# Patient Record
Sex: FEMALE | Race: WHITE | NOT HISPANIC OR LATINO | Employment: FULL TIME | URBAN - METROPOLITAN AREA
[De-identification: names, ages, dates, MRNs, and addresses within clinical notes are randomized per-mention and may not be internally consistent; named-entity substitution may affect disease eponyms.]

---

## 2022-04-14 ENCOUNTER — OFFICE VISIT (OUTPATIENT)
Dept: BARIATRICS | Facility: CLINIC | Age: 37
End: 2022-04-14
Payer: COMMERCIAL

## 2022-04-14 VITALS
BODY MASS INDEX: 44.38 KG/M2 | HEART RATE: 79 BPM | HEIGHT: 63 IN | TEMPERATURE: 98.1 F | DIASTOLIC BLOOD PRESSURE: 79 MMHG | WEIGHT: 250.5 LBS | SYSTOLIC BLOOD PRESSURE: 128 MMHG

## 2022-04-14 DIAGNOSIS — R63.5 ABNORMAL WEIGHT GAIN: ICD-10-CM

## 2022-04-14 DIAGNOSIS — K91.2 POSTSURGICAL MALABSORPTION: ICD-10-CM

## 2022-04-14 DIAGNOSIS — Z98.84 BARIATRIC SURGERY STATUS: ICD-10-CM

## 2022-04-14 DIAGNOSIS — Z48.815 ENCOUNTER FOR SURGICAL AFTERCARE FOLLOWING SURGERY OF DIGESTIVE SYSTEM: Primary | ICD-10-CM

## 2022-04-14 DIAGNOSIS — E66.01 OBESITY, CLASS III, BMI 40-49.9 (MORBID OBESITY) (HCC): ICD-10-CM

## 2022-04-14 PROCEDURE — 99203 OFFICE O/P NEW LOW 30 MIN: CPT | Performed by: NURSE PRACTITIONER

## 2022-04-14 RX ORDER — MULTIVITAMIN
1 TABLET ORAL DAILY
COMMUNITY

## 2022-04-14 RX ORDER — FERROUS SULFATE 325(65) MG
325 TABLET ORAL
COMMUNITY

## 2022-04-14 NOTE — PROGRESS NOTES
Assessment/Plan:     Patient ID: Joseph Giles is a 43 y o  female  Bariatric Surgery Status/Weight Gain    -s/p Jeny-En-Y Gastric Bypass with Dr Adelaide Wallace in 10/2007 and RNYGB revision with Dr Siomara Quinn in 2017  Presents to the office today for OD annual with concern of weight gain  Feels in the past two years, she noticed a more noticeable weight gain  Overall feels stress  Tries to eat healthy and meal portion  Snacks often throughout the day  Works from home  Walks 2-3 miles approximately 2-3 times per week  Does not food log  Interested in back to basics with possible medication therapy to help with her lose weight  Initial - 270 lbs (before RNYGB in 2007)  Andrew - 140 lbs (after RNYGB)    Initial - 230 lbs (before her revision)  Andrew - 216 lbs (after revision)  Current weight of 250 5 lbs - gained 20 lbs over her initial weight from her revision  She tolerates a regular diet  Denies having any other issues, no N/V/D/C, regurgitation, reflux or dysphagia  PLAN:     - Order UGI to assess anatomy  If normal, will refer to dietitian and  for diet and emotional support  Back to basics first  Pardeep binder provided to her  - Discuss with patient to increase her activity level - at least 150 minutes/week  - Start to food log, calorie count provided approximately 1400 calories to lost 1-2 lbs per week  - make healthy choices for snacks  - Routine follow up in 1 year for annual visit  - Continue with healthy lifestyle, adequate protein intake of 60 gm, fluid intake of at least 64 oz  - Continue with MVI daily  Advised to switch to pardeep MVI and add calcium supplements  - Activity as tolerated  - Labs ordered and will adjust accordingly if any deficiency  - Follow up with RD and SW as needed  · Continued/Maintain healthy weight loss with good nutrition intakes  · Adequate hydration with at least 64oz  fluid intake  · Follow diet as discussed    · Follow vitamin and mineral recommendations as reviewed with you  · Exercise as tolerated  · Colonoscopy referral made: not of age range  · Mammogram - will see ob-gyn    · Follow-up in 1 year  We kindly ask that your arrive 15 minutes before your scheduled appointment time with your provider to allow our staff to room you, get your vital signs and update your chart  · Get lab work done  Please call the office if you need a script  It is recommended to check with your insurance BEFORE getting labs done to make sure they are covered by your policy  · Call our office if you have any problems with abdominal pain especially associated with fever, chills, nausea, vomiting or any other concerns  · All  Post-bariatric surgery patients should be aware that very small quantities of any alcohol can cause impairment and it is very possible not to feel the effect  The effect can be in the system for several hours  It is also a stomach irritant  · It is advised to AVOID alcohol, Nonsteroidal antiinflammatory drugs (NSAIDS) and nicotine of all forms   Any of these can cause stomach irritation/pain  · Discussed the effects of alcohol on a bariatric patient and the increased impairment risk  · Keep up the good work! Postsurgical Malabsorption   -At risk for malabsorption of vitamins/minerals secondary to malabsorption and restriction of intake from bariatric surgery  -NOT Currently taking adequate postop bariatric surgery vitamin supplementation  - No recent bariatric labs - per chart review, low level of ferritin, 2020 - 8; 2021 - 16  Taking one iron supplement 325 mg (65 Fe)     -Next set of bariatric labs ordered for approximately 2 weeks  -Patient received education about the importance of adhering to a lifelong supplementation regimen to avoid vitamin/mineral deficiencies      Diagnoses and all orders for this visit:    Encounter for surgical aftercare following surgery of digestive system  -     Vitamin D 25 hydroxy; Future  -     Vitamin B12; Future  -     Zinc; Future  -     PTH, intact; Future  -     Vitamin A; Future  -     Vitamin B1, whole blood; Future  -     Iron Saturation %; Future  -     Folate; Future  -     Ferritin; Future  -     Comprehensive metabolic panel; Future  -     CBC and Platelet; Future  -     FL UPPER GI UGI; Future    Bariatric surgery status  -     Vitamin D 25 hydroxy; Future  -     Vitamin B12; Future  -     Zinc; Future  -     PTH, intact; Future  -     Vitamin A; Future  -     Vitamin B1, whole blood; Future  -     Iron Saturation %; Future  -     Folate; Future  -     Ferritin; Future  -     Comprehensive metabolic panel; Future  -     CBC and Platelet; Future  -     FL UPPER GI UGI; Future    Postsurgical malabsorption  -     Vitamin D 25 hydroxy; Future  -     Vitamin B12; Future  -     Zinc; Future  -     PTH, intact; Future  -     Vitamin A; Future  -     Vitamin B1, whole blood; Future  -     Iron Saturation %; Future  -     Folate; Future  -     Ferritin; Future  -     Comprehensive metabolic panel; Future  -     CBC and Platelet; Future  -     FL UPPER GI UGI; Future    Abnormal weight gain  -     Vitamin D 25 hydroxy; Future  -     Vitamin B12; Future  -     Zinc; Future  -     PTH, intact; Future  -     Vitamin A; Future  -     Vitamin B1, whole blood; Future  -     Iron Saturation %; Future  -     Folate; Future  -     Ferritin; Future  -     Comprehensive metabolic panel; Future  -     CBC and Platelet; Future  -     FL UPPER GI UGI; Future    Obesity, Class III, BMI 40-49 9 (morbid obesity) (HCC)  -     Vitamin D 25 hydroxy; Future  -     Vitamin B12; Future  -     Zinc; Future  -     PTH, intact; Future  -     Vitamin A; Future  -     Vitamin B1, whole blood; Future  -     Iron Saturation %; Future  -     Folate; Future  -     Ferritin; Future  -     Comprehensive metabolic panel; Future  -     CBC and Platelet;  Future  -     FL UPPER GI UGI; Future    Other orders  - Multiple Vitamin (multivitamin) tablet; Take 1 tablet by mouth daily  -     ferrous sulfate 325 (65 Fe) mg tablet; Take 325 mg by mouth daily with breakfast         Subjective:      Patient ID: Justus Fournier is a 43 y o  female  -s/p Jeny-En-Y Gastric Bypass with Dr Judith Lux in 10/2007 and RNYGB revision with Dr Jonathan Mancini in 2017  Presents to the office today for OD annual with concern of weight gain  Feels in the past two years, she noticed a more noticeable weight gain  Overall feels stress  Tries to eat healthy and meal portion  Snacks often throughout the day  Works from home  Walks 2-3 miles approximately 2-3 times per week  Does not food log  Interested in back to basics with possible medication therapy to help with her lose weight  Initial - 270 lbs (before RNYGB in 2007)  Andrew - 140 lbs (after RNYGB)    Initial - 230 lbs (before her revision)  Andrew - 216 lbs (after revision)  Current weight of 250 5 lbs - gained 20 lbs over her initial weight from her revision  She tolerates a regular diet  Denies having any other issues, no N/V/D/C, regurgitation, reflux or dysphagia  Initial: 230 lbs  Current: 250 5 lbs  EWL: (Weight loss is ahead of schedule at this post surgical period )  Andrew: 216 lbs  Current BMI is Body mass index is 45 09 kg/m²  · Tolerating a regular diet-yes  · Eating at least 60 grams of protein per day-yes  · Following 30/60 minute rule with liquids-no - but tries to do 30/30  · Drinking at least 64 ounces of fluid per day-yes  · Drinking carbonated beverages-yes - every other day  Diet soda  · Sufficient exercise- occasionally - walking  And at times at home work outs  · Using NSAIDs regularly-no  · Using nicotine-no  · Using alcohol-no  · Supplements: regular MVI + iron - advised to double her MVI  · EWL is 0% which places the patient ahead of schedule for expected post surgical weight loss at this time       The following portions of the patient's history were reviewed and updated as appropriate: allergies, current medications, past family history, past medical history, past social history, past surgical history and problem list     Review of Systems   Constitutional: Positive for fatigue and unexpected weight change  Respiratory: Negative  Cardiovascular: Negative  Gastrointestinal: Negative  Musculoskeletal: Negative  Skin: Negative  Neurological: Negative  Psychiatric/Behavioral: Negative  Objective:    /79 (BP Location: Left arm, Patient Position: Sitting, Cuff Size: Standard)   Pulse 79   Temp 98 1 °F (36 7 °C) (Tympanic)   Ht 5' 2 5" (1 588 m)   Wt 114 kg (250 lb 8 oz)   BMI 45 09 kg/m²      Physical Exam  Vitals and nursing note reviewed  Constitutional:       Appearance: Normal appearance  She is obese  Cardiovascular:      Rate and Rhythm: Normal rate and regular rhythm  Pulses: Normal pulses  Pulmonary:      Effort: Pulmonary effort is normal       Breath sounds: Normal breath sounds  Abdominal:      General: Bowel sounds are normal       Palpations: Abdomen is soft  Musculoskeletal:         General: Normal range of motion  Skin:     General: Skin is warm and dry  Neurological:      General: No focal deficit present  Mental Status: She is alert and oriented to person, place, and time  Psychiatric:         Mood and Affect: Mood normal          Behavior: Behavior normal          Thought Content:  Thought content normal          Judgment: Judgment normal

## 2022-04-14 NOTE — PATIENT INSTRUCTIONS
Plan:     - Get UGI first to assess anatomy  Call central scheduling to make an appt  I will call you with the results  - If normal, will refer to dietitian and   If change mind, can go to medical weight lost    - Labs to be done - make sure you are fasting    - Exercise as tolerated - increase 150 minute per week  - Start food logging using Venaxis or AngioSlide marcia - try to maintain 1400 calories for weight lost    - Can switch to bariatric multivitamin and please start on calcium - 1500 mg per day; 500 mg three times a day  - Please look in the ro binder    - follow 30/60 minute as much as possible

## 2022-05-10 ENCOUNTER — TELEPHONE (OUTPATIENT)
Dept: BARIATRICS | Facility: CLINIC | Age: 37
End: 2022-05-10

## 2022-05-10 NOTE — TELEPHONE ENCOUNTER
Called patient and reviewed her lab work as per provider  Patient understood  Transferred to MR to schedule future B-12 shots in our office  Patient will contact office with any questions or concerns

## 2022-05-10 NOTE — RESULT ENCOUNTER NOTE
Please call patient in regards to her lab results -     - All her labs were within limits EXCEPT FOR THE FOLLOWING:     - Vitamin B12 - critically low for gastric bypass patients  Would advise patient to come into our office to have B12 shots weekly for 4 doses and monthly for 3 doses  If she can come here, we can provide her the shots  If she would like her PCP to give her the shots, please let us know so I know where she would go for her B12 shots      - vitamin D level is mildly low  Please tell her to take 2000 IU of vitamin D3 along with her calcium supplements 500 mg three times per day

## 2022-05-11 ENCOUNTER — OFFICE VISIT (OUTPATIENT)
Dept: BARIATRICS | Facility: CLINIC | Age: 37
End: 2022-05-11
Payer: COMMERCIAL

## 2022-05-11 DIAGNOSIS — E53.8 VITAMIN B12 DEFICIENCY: Primary | ICD-10-CM

## 2022-05-11 PROCEDURE — 96372 THER/PROPH/DIAG INJ SC/IM: CPT

## 2022-05-11 PROCEDURE — RECHECK

## 2022-05-11 RX ADMIN — CYANOCOBALAMIN 250 MCG: 1000 INJECTION INTRAMUSCULAR; SUBCUTANEOUS at 10:42

## 2022-05-11 NOTE — PROGRESS NOTES
eal portion  Snacks often throughout the day  Works from home  Walks 2-3 miles approximately 2-3 times per week  Does not food log  Interested in back to basics with possible medication therapy to help with her lose weight       Initial - 270 lbs (before RNYGB in 2007)  Andrew - 140 lbs (after RNYGB)     Initial - 230 lbs (before her revision)  Andrew - 216 lbs (after revision)  Current weight of 250 5 lbs - gained 20 lbs over her initial weight from her revision  She tolerates a regular diet   Denies having any other issues, no N/V/D/C, regurgitation, reflux or dysphagia       PLAN:      - Order UGI to a

## 2022-05-12 LAB
25(OH)D3 SERPL-MCNC: 29 NG/ML (ref 30–100)
ALBUMIN SERPL-MCNC: 4.2 G/DL (ref 3.6–5.1)
ALBUMIN/GLOB SERPL: 1.3 (CALC) (ref 1–2.5)
ALP SERPL-CCNC: 98 U/L (ref 31–125)
ALT SERPL-CCNC: 15 U/L (ref 6–29)
AST SERPL-CCNC: 16 U/L (ref 10–30)
BASOPHILS # BLD AUTO: 51 CELLS/UL (ref 0–200)
BASOPHILS NFR BLD AUTO: 0.8 %
BILIRUB SERPL-MCNC: 0.5 MG/DL (ref 0.2–1.2)
BUN SERPL-MCNC: 16 MG/DL (ref 7–25)
BUN/CREAT SERPL: NORMAL (CALC) (ref 6–22)
CALCIUM SERPL-MCNC: 9.6 MG/DL (ref 8.6–10.2)
CALCIUM SERPL-MCNC: 9.6 MG/DL (ref 8.6–10.2)
CHLORIDE SERPL-SCNC: 102 MMOL/L (ref 98–110)
CO2 SERPL-SCNC: 29 MMOL/L (ref 20–32)
CREAT SERPL-MCNC: 0.7 MG/DL (ref 0.5–1.1)
EOSINOPHIL # BLD AUTO: 256 CELLS/UL (ref 15–500)
EOSINOPHIL NFR BLD AUTO: 4 %
ERYTHROCYTE [DISTWIDTH] IN BLOOD BY AUTOMATED COUNT: 13 % (ref 11–15)
FERRITIN SERPL-MCNC: 37 NG/ML (ref 16–154)
FOLATE SERPL-MCNC: 9.8 NG/ML
GLOBULIN SER CALC-MCNC: 3.2 G/DL (CALC) (ref 1.9–3.7)
GLUCOSE SERPL-MCNC: 88 MG/DL (ref 65–99)
HCT VFR BLD AUTO: 43 % (ref 35–45)
HGB BLD-MCNC: 14.1 G/DL (ref 11.7–15.5)
IRON SATN MFR SERPL: 24 % (CALC) (ref 16–45)
IRON SERPL-MCNC: 81 MCG/DL (ref 40–190)
LYMPHOCYTES # BLD AUTO: 2688 CELLS/UL (ref 850–3900)
LYMPHOCYTES NFR BLD AUTO: 42 %
MCH RBC QN AUTO: 28.9 PG (ref 27–33)
MCHC RBC AUTO-ENTMCNC: 32.8 G/DL (ref 32–36)
MCV RBC AUTO: 88.1 FL (ref 80–100)
MONOCYTES # BLD AUTO: 538 CELLS/UL (ref 200–950)
MONOCYTES NFR BLD AUTO: 8.4 %
NEUTROPHILS # BLD AUTO: 2867 CELLS/UL (ref 1500–7800)
NEUTROPHILS NFR BLD AUTO: 44.8 %
PLATELET # BLD AUTO: 388 THOUSAND/UL (ref 140–400)
PMV BLD REES-ECKER: 9.8 FL (ref 7.5–12.5)
POTASSIUM SERPL-SCNC: 4.4 MMOL/L (ref 3.5–5.3)
PROT SERPL-MCNC: 7.4 G/DL (ref 6.1–8.1)
PTH-INTACT SERPL-MCNC: 69 PG/ML (ref 16–77)
RBC # BLD AUTO: 4.88 MILLION/UL (ref 3.8–5.1)
SL AMB EGFR AFRICAN AMERICAN: 127 ML/MIN/1.73M2
SL AMB EGFR NON AFRICAN AMERICAN: 110 ML/MIN/1.73M2
SODIUM SERPL-SCNC: 138 MMOL/L (ref 135–146)
TIBC SERPL-MCNC: 334 MCG/DL (CALC) (ref 250–450)
VIT A SERPL-MCNC: 47 MCG/DL (ref 38–98)
VIT B1 BLD-SCNC: 68 NMOL/L (ref 78–185)
VIT B12 SERPL-MCNC: 171 PG/ML (ref 200–1100)
WBC # BLD AUTO: 6.4 THOUSAND/UL (ref 3.8–10.8)
ZINC SERPL-MCNC: 73 MCG/DL (ref 60–130)

## 2022-05-16 DIAGNOSIS — E51.9 THIAMINE DEFICIENCY: ICD-10-CM

## 2022-05-16 DIAGNOSIS — Z98.84 BARIATRIC SURGERY STATUS: Primary | ICD-10-CM

## 2022-05-16 DIAGNOSIS — K91.2 POSTSURGICAL MALABSORPTION: ICD-10-CM

## 2022-05-16 DIAGNOSIS — E53.8 VITAMIN B12 DEFICIENCY: ICD-10-CM

## 2022-05-16 DIAGNOSIS — E55.9 VITAMIN D DEFICIENCY: ICD-10-CM

## 2022-05-18 ENCOUNTER — OFFICE VISIT (OUTPATIENT)
Dept: BARIATRICS | Facility: CLINIC | Age: 37
End: 2022-05-18
Payer: COMMERCIAL

## 2022-05-18 DIAGNOSIS — E53.8 VITAMIN B12 DEFICIENCY: ICD-10-CM

## 2022-05-18 PROCEDURE — RECHECK

## 2022-05-18 PROCEDURE — 96372 THER/PROPH/DIAG INJ SC/IM: CPT

## 2022-05-18 RX ADMIN — CYANOCOBALAMIN 250 MCG: 1000 INJECTION INTRAMUSCULAR; SUBCUTANEOUS at 10:30

## 2022-05-25 ENCOUNTER — OFFICE VISIT (OUTPATIENT)
Dept: BARIATRICS | Facility: CLINIC | Age: 37
End: 2022-05-25
Payer: COMMERCIAL

## 2022-05-25 DIAGNOSIS — E66.01 OBESITY, CLASS III, BMI 40-49.9 (MORBID OBESITY) (HCC): ICD-10-CM

## 2022-05-25 DIAGNOSIS — E66.9 OBESITY, CLASS I, BMI 30-34.9: Primary | ICD-10-CM

## 2022-05-25 PROCEDURE — RECHECK: Performed by: NURSE PRACTITIONER

## 2022-05-25 RX ADMIN — CYANOCOBALAMIN 250 MCG: 1000 INJECTION INTRAMUSCULAR; SUBCUTANEOUS at 11:19

## 2022-05-26 ENCOUNTER — HOSPITAL ENCOUNTER (OUTPATIENT)
Dept: RADIOLOGY | Facility: HOSPITAL | Age: 37
Discharge: HOME | End: 2022-05-26
Payer: COMMERCIAL

## 2022-05-26 DIAGNOSIS — Z48.815 ENCOUNTER FOR SURGICAL AFTERCARE FOLLOWING SURGERY OF DIGESTIVE SYSTEM: ICD-10-CM

## 2022-05-26 DIAGNOSIS — Z98.84 BARIATRIC SURGERY STATUS: ICD-10-CM

## 2022-05-26 DIAGNOSIS — K91.2 POSTSURGICAL MALABSORPTION: ICD-10-CM

## 2022-05-26 DIAGNOSIS — E66.01 OBESITY, CLASS III, BMI 40-49.9 (MORBID OBESITY) (HCC): ICD-10-CM

## 2022-05-26 DIAGNOSIS — R63.5 ABNORMAL WEIGHT GAIN: ICD-10-CM

## 2022-05-26 PROCEDURE — 74240 X-RAY XM UPR GI TRC 1CNTRST: CPT

## 2022-05-31 ENCOUNTER — TELEPHONE (OUTPATIENT)
Dept: BARIATRICS | Facility: CLINIC | Age: 37
End: 2022-05-31

## 2022-05-31 NOTE — TELEPHONE ENCOUNTER
KENDRICK Verde  Cc: Michel Jade  Can you please call [facility]  [facility]   Her upper GI is normal  Per tere note for normal UGI she can see RD and SW for support please schedule her if she is interested thank you

## 2022-06-01 ENCOUNTER — OFFICE VISIT (OUTPATIENT)
Dept: BARIATRICS | Facility: CLINIC | Age: 37
End: 2022-06-01
Payer: COMMERCIAL

## 2022-06-01 DIAGNOSIS — K91.2 POSTSURGICAL MALABSORPTION: ICD-10-CM

## 2022-06-01 DIAGNOSIS — E53.8 VITAMIN B12 DEFICIENCY: ICD-10-CM

## 2022-06-01 DIAGNOSIS — Z98.84 BARIATRIC SURGERY STATUS: ICD-10-CM

## 2022-06-01 DIAGNOSIS — Z48.815 ENCOUNTER FOR SURGICAL AFTERCARE FOLLOWING SURGERY OF DIGESTIVE SYSTEM: ICD-10-CM

## 2022-06-01 PROCEDURE — RECHECK

## 2022-06-01 PROCEDURE — 96372 THER/PROPH/DIAG INJ SC/IM: CPT

## 2022-06-01 RX ORDER — CYANOCOBALAMIN 1000 UG/ML
250 INJECTION INTRAMUSCULAR; SUBCUTANEOUS ONCE
Status: SHIPPED | OUTPATIENT
Start: 2022-09-06

## 2022-06-01 RX ORDER — CYANOCOBALAMIN 1000 UG/ML
250 INJECTION INTRAMUSCULAR; SUBCUTANEOUS ONCE
Status: COMPLETED | OUTPATIENT
Start: 2022-07-06 | End: 2022-07-06

## 2022-06-01 RX ORDER — CYANOCOBALAMIN 1000 UG/ML
250 INJECTION INTRAMUSCULAR; SUBCUTANEOUS ONCE
Status: COMPLETED | OUTPATIENT
Start: 2022-08-02 | End: 2022-08-03

## 2022-06-01 RX ADMIN — CYANOCOBALAMIN 250 MCG: 1000 INJECTION INTRAMUSCULAR; SUBCUTANEOUS at 10:38

## 2022-07-06 ENCOUNTER — OFFICE VISIT (OUTPATIENT)
Dept: BARIATRICS | Facility: CLINIC | Age: 37
End: 2022-07-06
Payer: COMMERCIAL

## 2022-07-06 DIAGNOSIS — E53.8 VITAMIN B12 DEFICIENCY: Primary | ICD-10-CM

## 2022-07-06 PROCEDURE — RECHECK

## 2022-07-06 RX ADMIN — CYANOCOBALAMIN 250 MCG: 1000 INJECTION, SOLUTION INTRAMUSCULAR; SUBCUTANEOUS at 09:20

## 2022-08-03 ENCOUNTER — OFFICE VISIT (OUTPATIENT)
Dept: BARIATRICS | Facility: CLINIC | Age: 37
End: 2022-08-03
Payer: COMMERCIAL

## 2022-08-03 DIAGNOSIS — E53.8 VITAMIN B12 DEFICIENCY: Primary | ICD-10-CM

## 2022-08-03 PROCEDURE — 96372 THER/PROPH/DIAG INJ SC/IM: CPT

## 2022-08-03 PROCEDURE — RECHECK: Performed by: NURSE PRACTITIONER

## 2022-08-03 RX ADMIN — CYANOCOBALAMIN 250 MCG: 1000 INJECTION, SOLUTION INTRAMUSCULAR; SUBCUTANEOUS at 09:29

## 2022-08-18 ENCOUNTER — TELEPHONE (OUTPATIENT)
Dept: BARIATRICS | Facility: CLINIC | Age: 37
End: 2022-08-18

## 2022-08-18 NOTE — TELEPHONE ENCOUNTER
Reached out to Pt re: scheduled B-12 injection  Left VM advising Pt her appt needs to be rescheduled and requested Pt contact office

## 2022-09-06 ENCOUNTER — OFFICE VISIT (OUTPATIENT)
Dept: BARIATRICS | Facility: CLINIC | Age: 37
End: 2022-09-06
Payer: COMMERCIAL

## 2022-09-06 DIAGNOSIS — E53.8 VITAMIN B12 DEFICIENCY: ICD-10-CM

## 2022-09-06 PROCEDURE — 96372 THER/PROPH/DIAG INJ SC/IM: CPT

## 2022-09-06 PROCEDURE — RECHECK

## 2022-09-06 RX ADMIN — CYANOCOBALAMIN 250 MCG: 1000 INJECTION, SOLUTION INTRAMUSCULAR; SUBCUTANEOUS at 11:13

## 2023-05-04 LAB
25(OH)D3 SERPL-MCNC: 31 NG/ML (ref 30–100)
ALBUMIN SERPL-MCNC: 3.8 G/DL (ref 3.6–5.1)
ALBUMIN/GLOB SERPL: 1.3 (CALC) (ref 1–2.5)
ALP SERPL-CCNC: 94 U/L (ref 31–125)
ALT SERPL-CCNC: 17 U/L (ref 6–29)
AST SERPL-CCNC: 16 U/L (ref 10–30)
BILIRUB SERPL-MCNC: 0.5 MG/DL (ref 0.2–1.2)
BUN SERPL-MCNC: 12 MG/DL (ref 7–25)
BUN/CREAT SERPL: NORMAL (CALC) (ref 6–22)
CALCIUM SERPL-MCNC: 9.1 MG/DL (ref 8.6–10.2)
CALCIUM SERPL-MCNC: 9.1 MG/DL (ref 8.6–10.2)
CHLORIDE SERPL-SCNC: 106 MMOL/L (ref 98–110)
CO2 SERPL-SCNC: 26 MMOL/L (ref 20–32)
CREAT SERPL-MCNC: 0.69 MG/DL (ref 0.5–0.97)
ERYTHROCYTE [DISTWIDTH] IN BLOOD BY AUTOMATED COUNT: 13 % (ref 11–15)
FERRITIN SERPL-MCNC: 13 NG/ML (ref 16–154)
FOLATE SERPL-MCNC: 10.7 NG/ML
GFR/BSA.PRED SERPLBLD CYS-BASED-ARV: 113 ML/MIN/1.73M2
GLOBULIN SER CALC-MCNC: 2.9 G/DL (CALC) (ref 1.9–3.7)
GLUCOSE SERPL-MCNC: 84 MG/DL (ref 65–99)
HCT VFR BLD AUTO: 41 % (ref 35–45)
HGB BLD-MCNC: 13.6 G/DL (ref 11.7–15.5)
IRON SATN MFR SERPL: 32 % (CALC) (ref 16–45)
IRON SERPL-MCNC: 109 MCG/DL (ref 40–190)
MCH RBC QN AUTO: 27.6 PG (ref 27–33)
MCHC RBC AUTO-ENTMCNC: 33.2 G/DL (ref 32–36)
MCV RBC AUTO: 83.3 FL (ref 80–100)
PLATELET # BLD AUTO: 387 THOUSAND/UL (ref 140–400)
PMV BLD REES-ECKER: 10.7 FL (ref 7.5–12.5)
POTASSIUM SERPL-SCNC: 4.3 MMOL/L (ref 3.5–5.3)
PROT SERPL-MCNC: 6.7 G/DL (ref 6.1–8.1)
PTH-INTACT SERPL-MCNC: 52 PG/ML (ref 16–77)
RBC # BLD AUTO: 4.92 MILLION/UL (ref 3.8–5.1)
SODIUM SERPL-SCNC: 140 MMOL/L (ref 135–146)
TIBC SERPL-MCNC: 344 MCG/DL (CALC) (ref 250–450)
VIT B12 SERPL-MCNC: 252 PG/ML (ref 200–1100)
WBC # BLD AUTO: 6.8 THOUSAND/UL (ref 3.8–10.8)
ZINC SERPL-MCNC: 63 MCG/DL (ref 60–130)

## 2023-05-07 LAB
25(OH)D3 SERPL-MCNC: 31 NG/ML (ref 30–100)
ALBUMIN SERPL-MCNC: 3.8 G/DL (ref 3.6–5.1)
ALBUMIN/GLOB SERPL: 1.3 (CALC) (ref 1–2.5)
ALP SERPL-CCNC: 94 U/L (ref 31–125)
ALT SERPL-CCNC: 17 U/L (ref 6–29)
AST SERPL-CCNC: 16 U/L (ref 10–30)
BILIRUB SERPL-MCNC: 0.5 MG/DL (ref 0.2–1.2)
BUN SERPL-MCNC: 12 MG/DL (ref 7–25)
BUN/CREAT SERPL: NORMAL (CALC) (ref 6–22)
CALCIUM SERPL-MCNC: 9.1 MG/DL (ref 8.6–10.2)
CALCIUM SERPL-MCNC: 9.1 MG/DL (ref 8.6–10.2)
CHLORIDE SERPL-SCNC: 106 MMOL/L (ref 98–110)
CO2 SERPL-SCNC: 26 MMOL/L (ref 20–32)
CREAT SERPL-MCNC: 0.69 MG/DL (ref 0.5–0.97)
ERYTHROCYTE [DISTWIDTH] IN BLOOD BY AUTOMATED COUNT: 13 % (ref 11–15)
FERRITIN SERPL-MCNC: 13 NG/ML (ref 16–154)
FOLATE SERPL-MCNC: 10.7 NG/ML
GFR/BSA.PRED SERPLBLD CYS-BASED-ARV: 113 ML/MIN/1.73M2
GLOBULIN SER CALC-MCNC: 2.9 G/DL (CALC) (ref 1.9–3.7)
GLUCOSE SERPL-MCNC: 84 MG/DL (ref 65–99)
HCT VFR BLD AUTO: 41 % (ref 35–45)
HGB BLD-MCNC: 13.6 G/DL (ref 11.7–15.5)
IRON SATN MFR SERPL: 32 % (CALC) (ref 16–45)
IRON SERPL-MCNC: 109 MCG/DL (ref 40–190)
MCH RBC QN AUTO: 27.6 PG (ref 27–33)
MCHC RBC AUTO-ENTMCNC: 33.2 G/DL (ref 32–36)
MCV RBC AUTO: 83.3 FL (ref 80–100)
PLATELET # BLD AUTO: 387 THOUSAND/UL (ref 140–400)
PMV BLD REES-ECKER: 10.7 FL (ref 7.5–12.5)
POTASSIUM SERPL-SCNC: 4.3 MMOL/L (ref 3.5–5.3)
PROT SERPL-MCNC: 6.7 G/DL (ref 6.1–8.1)
PTH-INTACT SERPL-MCNC: 52 PG/ML (ref 16–77)
RBC # BLD AUTO: 4.92 MILLION/UL (ref 3.8–5.1)
SODIUM SERPL-SCNC: 140 MMOL/L (ref 135–146)
TIBC SERPL-MCNC: 344 MCG/DL (CALC) (ref 250–450)
VIT A SERPL-MCNC: 32 MCG/DL (ref 38–98)
VIT B1 BLD-SCNC: 165 NMOL/L (ref 78–185)
VIT B12 SERPL-MCNC: 252 PG/ML (ref 200–1100)
WBC # BLD AUTO: 6.8 THOUSAND/UL (ref 3.8–10.8)
ZINC SERPL-MCNC: 63 MCG/DL (ref 60–130)

## 2023-05-08 ENCOUNTER — TELEPHONE (OUTPATIENT)
Dept: BARIATRICS | Facility: CLINIC | Age: 38
End: 2023-05-08

## 2023-05-08 DIAGNOSIS — E53.8 VITAMIN B12 DEFICIENCY: ICD-10-CM

## 2023-05-08 DIAGNOSIS — E50.9 VITAMIN A DEFICIENCY: ICD-10-CM

## 2023-05-08 DIAGNOSIS — K91.2 POSTSURGICAL MALABSORPTION: ICD-10-CM

## 2023-05-08 DIAGNOSIS — R79.0 LOW FERRITIN: ICD-10-CM

## 2023-05-08 DIAGNOSIS — Z98.84 BARIATRIC SURGERY STATUS: Primary | ICD-10-CM

## 2023-05-08 NOTE — TELEPHONE ENCOUNTER
Called pt to review lab result, Left VM        ----- Message from Savage Juliocesar sent at 5/8/2023 10:24 AM EDT -----  Please call patient to let her know we have received all her labs - it shows her B12 level has improved  She doesn't need shots anymore but does need to be on vitamin B12 supplements  I would recommend getting B12 1000 mcg sublingual supplements to take once a day  Your vitamin A level is  low, which can affect your night vision  Recommend that you take 10,000  IU of retinyl acetate or retinyl palmitate (Vitamin A) per day for 3 months  It is important that you take an actual vitamin A supplement for repletion, and not a carotene based supplement  Vitamin A can cause birth defects if you are pregnant  If you are pregnant, consult with your OB for recommendations  Your OB may recommend carotene supplements  If you are not sure if you are pregnant, take a home pregnancy test to determine if you are pregnant  You are not to take vitamin A supplements if you are pregnant  After 3 months,  discontinue the vitamin A supplement and check your vitamin A level  A lab slip is enclosed to check your level in 3 months  Long term vitamin A supplementation can be toxic, so it is important to discontinue your vitamin A supplementation after 12 weeks  If you experience symptoms of toxicity such as :  Nausea, vomiting, blurred vision, severe headache, and vertigo, discontinue the vitamin A supplement immediately and call the office      - your ferritin (iron storage) is low but your iron levels are normal  Please continue with your iron supplements as is for now  Would like to repeat your vitamin B12, vitamin A and iron levels in 3 months  Will send the scripts to you because [facility]

## 2023-05-23 NOTE — PROGRESS NOTES
"Bariatric  Nutrition Assessment Note    Type of surgery  Gastric bypass: laparoscopic  Surgery Date: Dr Juliana Schaumann 10/ 2007/ Revision with Dr Sherron Del Real 2017 at [facility]  15 years  post-op  Surgeon: post op care provided the NP     Nutrition Assessment   Rosangela MUSTAFA  42 y o   female  Ht 5' 2 5\" (1 588 m)   Wt 113 kg (249 lb 12 8 oz)   BMI 44 96 kg/m²      Wt Readings from Last 3 Encounters:   04/14/23 112 kg (247 lb)   04/14/22 114 kg (250 lb 8 oz)   10/30/12 83 kg (183 lb)       [place]  [place] Equation:  1757 kcal per day   Estimated calories for weight maintenance:  2108 kcal ( sedentary  )   Estimated calories for weight loss 4726-0261 kcal per day ( 1-2# per wk wt loss - sedentary )  Estimated protein needs 63-95 grams per day (1 0-1 5 gms/kg IBW )   Estimated fluid needs 63-74 ounces per day ( 30-35 ml/kg IBW )      Weight on Day of Weight Loss Surgery: 270 lbs   Weight in (lb) to have BMI = 25: 139 3 lbs   Pre-Op Excess Wt: 130 7 lbs   Post-Op Wt Loss: 23#/ 17 5% EBWL in 15 year(s)  Andrew wt = 140 lbs     Pt reports increased weight with 2 pregnancies that she had after surgery  Review of History and Medications   No past medical history on file  Past Surgical History:   Procedure Laterality Date   • CHOLECYSTECTOMY     • DARRYN-EN-Y PROCEDURE       Social History     Socioeconomic History   • Marital status: Single     Spouse name: Not on file   • Number of children: Not on file   • Years of education: Not on file   • Highest education level: Not on file   Occupational History   • Not on file   Tobacco Use   • Smoking status: Never   • Smokeless tobacco: Not on file   Substance and Sexual Activity   • Alcohol use:  Yes   • Drug use: Not on file   • Sexual activity: Not on file   Other Topics Concern   • Not on file   Social History Narrative   • Not on file     Social Determinants of Health     Financial Resource Strain: Not on file   Food Insecurity: Not on file   Transportation Needs: Not on file " Physical Activity: Not on file   Stress: Not on file   Social Connections: Not on file   Intimate Partner Violence: Not on file   Housing Stability: Not on file       Current Outpatient Medications:   •  Calcium Ascorbate (VITAMIN C) 500 mg tablet, Take 500 mg by mouth daily, Disp: , Rfl:   •  ferrous sulfate 325 (65 Fe) mg tablet, Take 325 mg by mouth daily with breakfast, Disp: , Rfl:   •  Multiple Vitamin (multivitamin) tablet, Take 1 tablet by mouth daily, Disp: , Rfl:     Food Intake and Lifestyle Assessment   Food Intake Assessment completed via usual diet recall  Follows 30/ 30 rule   Breakfast: 7 am to 9 am   2 eggs with cheese , or carb wrap - makes at home   1 cup coffee with SF vanilla cream   Or will have a protein shake for breakfast - diony coffee Atkins   Snack: 0  Lunch: 12 to 2 pm Four days per week   Left overs from dinner   Protein- chicken, beef, seafood ( fish ) and pork ( home made pork sausage )   brussel sprouts or green beans , occasional red potato or yam   Snack: 0  Dinner: 6 pm - similar lunch   Snack: sometime ice cream 2-3 times per week   Sometimes cheese or cashews   Bedtime   Beverage intake: water, coffee/tea and caffeine free diet coke   Diet texture/stage: regular  Protein supplement: add protein scoop, with crystal one per day   44 calories/ 33 grams protein   Estimated protein intake per day: 80-90 grams per day   Estimated fluid intake per day: 32 ounces Yeti 2 to 3 per day   Meals eaten away from home: once per week - [facility]   Typical meal pattern: 3 meals per day and 0-1 snacks per day  Eating Behaviors: Appropriate diet advancement and Large portion sizes    Vitamin and Mineral regimen: she is taking one bariatric vitamin and mineral without iron ( this was an error)  3 capsules per day   She has been inconsistent    Reports ongoing fatigue- was getting B12 shots and felt she has more energy     Food allergies or intolerances: none noted   Cultural or Restoration "considerations: n/a    Physical Assessment  Nutrition Related Findings  Return of Hunger    Physical Activity  Types of exercise: Walking  2-3 times per week for 2-3 miles   Plans to purchase a [place]   Current physical limitations: none    Psychosocial Assessment   Support systems: spouse  Socioeconomic factors: 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     Nutrition Diagnosis  Diagnosis: Overweight / Obesity (NC-3 3)  Related to: Physical inactivity and Excessive energy intake  As Evidenced by: BMI >25 and Unintentional weight gain     Nutrition Prescription: Recommend the following diet  1400 kcal / 88 grams protein (0681-8520 kcal ) 85-90 grams protein     Meal Plan ( Dalton/Pro/Carb)   Breakfast: 300/15/30  Snack: 45/30/-   ( genepro mixed in crystal lite )   Lunch: 300/15/30  Snack: 150/>5/20  Dinner: 300/15/30  Snack: 150/>5/20     1250 kcal /85 grams protein       Interventions and Teaching   Patient educated on post-op nutrition guidelines  Patient educated and handouts provided    Surgical changes to stomach / GI  Capacity of post-surgery stomach  Adequate hydration  Sugar and fat restriction to decrease \"dumping syndrome\"  Fat restriction to decrease steatorrhea  Expected weight loss  Weight loss plateaus/ possibility of weight regain  Exercise  Nutrition considerations after surgery  Protein supplements  Appropriate carbohydrate, protein, and fat intake, and food/fluid choices to maximize safe weight loss, nutrient intake, and tolerance   Dietary and lifestyle changes  Possible problems with poor eating habits  Intuitive eating  Techniques for self monitoring and keeping daily food journal  Vitamin / Mineral supplementation of Multivitamin with minerals, Calcium, Vitamin B12, Iron, Fat Soluble vitamins and Vitamin D  Patient is not currently pregnant and doesn't desire to become pregnant a minimum of one year post-op    Provided with information on Back to Basics and Healthy Core program " Discussed MWM and medications that may help with weight loss     Education provided to: patient    Barriers to learning: No barriers identified    Readiness to change: preparation and action    Comprehension: needs reinforcement and verbalizes understanding     Expected Compliance: good    Evaluation/Monitoring   Eating pattern as discussed Tolerance of nutrition prescription Body weight Lab values Physical activity Bowel pattern    Goals  Food journal, Exercise 30 minutes 5 times per week, Eat 3 meals per day and Eliminate mindless snacking   Provided with  Meal plan 6985-5553 kcal per day   Focus on NOT grazing to decrease caloric intake   Food log 0230-4437 kcal per day/ 75-85 grams protein   Continue with bariatric vitamin /mineral( consider switching to one a day with iron )   Current vitamin and mineral contains 3000 mc ( 12391 IU ) of vitamin A per day   45-65 mg of iron per day with vitamin C to increase absorption   F/U with PCP for B12 shots as current vitamin /mineral already contains 1000 mcg B12   Increase intake of high vitamin A/Betacarotene foods and continue with bariatric vitamin and mineral which contains 3000 mcg of vitamin A per day   F/U in one for body comp     Repeat blood work as ordered by NP      Time Spent:   45 Minutes

## 2023-05-25 ENCOUNTER — OFFICE VISIT (OUTPATIENT)
Dept: BARIATRICS | Facility: CLINIC | Age: 38
End: 2023-05-25

## 2023-05-25 VITALS — HEIGHT: 63 IN | WEIGHT: 249.8 LBS | BODY MASS INDEX: 44.26 KG/M2

## 2023-05-25 DIAGNOSIS — R63.5 WEIGHT GAIN FOLLOWING GASTRIC BYPASS SURGERY: Primary | ICD-10-CM

## 2023-05-25 DIAGNOSIS — Z98.84 WEIGHT GAIN FOLLOWING GASTRIC BYPASS SURGERY: Primary | ICD-10-CM

## 2023-05-31 LAB
IRON SATN MFR SERPL: 30 % (CALC) (ref 16–45)
IRON SERPL-MCNC: 104 MCG/DL (ref 40–190)
TIBC SERPL-MCNC: 350 MCG/DL (CALC) (ref 250–450)
VIT B12 SERPL-MCNC: 289 PG/ML (ref 200–1100)

## 2023-06-04 LAB
IRON SATN MFR SERPL: 30 % (CALC) (ref 16–45)
IRON SERPL-MCNC: 104 MCG/DL (ref 40–190)
TIBC SERPL-MCNC: 350 MCG/DL (CALC) (ref 250–450)
VIT A SERPL-MCNC: 42 MCG/DL (ref 38–98)
VIT B12 SERPL-MCNC: 289 PG/ML (ref 200–1100)

## 2023-06-28 NOTE — PROGRESS NOTES
"Bariatric  Nutrition Assessment Note    Type of surgery  Gastric bypass: laparoscopic  Surgery Date: Dr Tod Urrutia 10/ 2007/ Revision with Dr Brandyn Jones 2017 at [facility]  15 years  post-op  Surgeon: post op care provided the NP     Pt is here today for body comp     Nutrition Assessment   Bolivar Medrano  42 y o   female  Ht 5' 5 25\" (1 657 m)   Wt 113 kg (249 lb 9 oz)   BMI 41 21 kg/m²      Pt maintained weight since last appointment      Wt Readings from Last 3 Encounters:   05/25/23 113 kg (249 lb 12 8 oz)   04/14/23 112 kg (247 lb)   04/14/22 114 kg (250 lb 8 oz)     SECA REE= 1765 kcal per day     [place]- StGattis Lesches Equation:  1757 kcal per day   Estimated calories for weight maintenance:  2108 kcal ( sedentary  )   Estimated calories for weight loss 3495-3629 kcal per day ( 1-2# per wk wt loss - sedentary )  Estimated protein needs 63-95 grams per day (1 0-1 5 gms/kg IBW )   Estimated fluid needs 63-74 ounces per day ( 30-35 ml/kg IBW )      Weight on Day of Weight Loss Surgery: 270 lbs   Weight in (lb) to have BMI = 25: 139 3 lbs   Pre-Op Excess Wt: 130 7 lbs   Post-Op Wt Loss: 23#/ 17 5% EBWL in 15 year(s)  Andrew wt = 140 lbs     Pt reports increased weight with 2 pregnancies that she had after surgery  Review of History and Medications   No past medical history on file  Past Surgical History:   Procedure Laterality Date   • CHOLECYSTECTOMY     • DARRYN-EN-Y PROCEDURE       Social History     Socioeconomic History   • Marital status: Single     Spouse name: Not on file   • Number of children: Not on file   • Years of education: Not on file   • Highest education level: Not on file   Occupational History   • Not on file   Tobacco Use   • Smoking status: Never   • Smokeless tobacco: Not on file   Substance and Sexual Activity   • Alcohol use:  Yes   • Drug use: Not on file   • Sexual activity: Not on file   Other Topics Concern   • Not on file   Social History Narrative   • Not on file     Social Determinants of " Health     Financial Resource Strain: Not on file   Food Insecurity: Not on file   Transportation Needs: Not on file   Physical Activity: Not on file   Stress: Not on file   Social Connections: Not on file   Intimate Partner Violence: Not on file   Housing Stability: Not on file       Current Outpatient Medications:   •  Calcium Ascorbate (VITAMIN C) 500 mg tablet, Take 500 mg by mouth daily, Disp: , Rfl:   •  ferrous sulfate 325 (65 Fe) mg tablet, Take 325 mg by mouth daily with breakfast, Disp: , Rfl:   •  Multiple Vitamin (multivitamin) tablet, Take 1 tablet by mouth daily, Disp: , Rfl:     Food Intake and Lifestyle Assessment   Food Intake Assessment completed via usual diet recall  Follows 30/ 30 rule   Breakfast: 7 am to 9 am   2 eggs with cheese , or carb wrap - makes at home   1 cup coffee with SF vanilla cream   Or will have a protein shake for breakfast - diony coffee Atkins   Snack: 0  Lunch: 12 to 2 pm Four days per week   Left overs from dinner   Protein- chicken, beef, seafood ( fish ) and pork ( home made pork sausage )   brussel sprouts or green beans , occasional red potato or yam   Snack: 0  Dinner: 6 pm - similar lunch   Snack: sometime ice cream 2-3 times per week   Sometimes cheese or cashews   Bedtime   Beverage intake: water, coffee/tea and caffeine free diet coke   Diet texture/stage: regular  Protein supplement: add protein scoop, with crystal one per day   44 calories/ 33 grams protein   Estimated protein intake per day: 80-90 grams per day   Estimated fluid intake per day: 32 ounces Yeti 2 to 3 per day   Meals eaten away from home: once per week - [facility]   Typical meal pattern: 3 meals per day and 0-1 snacks per day  Eating Behaviors: Appropriate diet advancement and Large portion sizes    Vitamin and Mineral regimen: she is taking one bariatric vitamin and mineral without iron ( this was an error)  3 capsules per day   She has been inconsistent    Reports ongoing fatigue- was "getting B12 shots and felt she has more energy     Food allergies or intolerances: none noted   Cultural or Nondenominational considerations: n/a    Physical Assessment  Nutrition Related Findings  Return of Hunger    Physical Activity  Types of exercise: Walking  2-3 times per week for 2-3 miles   Plans to purchase a Pelaton   Current physical limitations: none    Psychosocial Assessment   Support systems: spouse  Socioeconomic factors: 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     Nutrition Diagnosis  Diagnosis: Overweight / Obesity (NC-3 3)  Related to: Physical inactivity and Excessive energy intake  As Evidenced by: BMI >25 and Unintentional weight gain     Nutrition Prescription: Recommend the following diet  1400 kcal / 88 grams protein (6968-6303 kcal ) 85-90 grams protein     Meal Plan ( Dalton/Pro/Carb)   Breakfast: 300/15/30  Snack: 45/30/-   ( genepro mixed in crystal lite )   Lunch: 300/15/30  Snack: 150/>5/20  Dinner: 300/15/30  Snack: 150/>5/20     1250 kcal /85 grams protein       Interventions and Teaching   Patient educated on post-op nutrition guidelines  Patient educated and handouts provided    Surgical changes to stomach / GI  Capacity of post-surgery stomach  Adequate hydration  Sugar and fat restriction to decrease \"dumping syndrome\"  Fat restriction to decrease steatorrhea  Expected weight loss  Weight loss plateaus/ possibility of weight regain  Exercise  Nutrition considerations after surgery  Protein supplements  Appropriate carbohydrate, protein, and fat intake, and food/fluid choices to maximize safe weight loss, nutrient intake, and tolerance   Dietary and lifestyle changes  Possible problems with poor eating habits  Intuitive eating  Techniques for self monitoring and keeping daily food journal  Vitamin / Mineral supplementation of Multivitamin with minerals, Calcium, Vitamin B12, Iron, Fat Soluble vitamins and Vitamin D  Patient is not currently pregnant and doesn't desire to " become pregnant a minimum of one year post-op    Provided with information on Back to Basics and Healthy Core program   Discussed MWM and medications that may help with weight loss     Education provided to: patient    Barriers to learning: No barriers identified    Readiness to change: preparation and action    Comprehension: needs reinforcement and verbalizes understanding     Expected Compliance: good    Evaluation/Monitoring   Eating pattern as discussed Tolerance of nutrition prescription Body weight Lab values Physical activity Bowel pattern  Pt had a stressful month with going to [facility] for her son's care multiple times  She started to keep food log in [employer] but as her time commitments and stress increased, fell off  She has eliminated grazing and has planned snacks  She has incorporated protein shakes and bars into her diet when she does not have time for a meal   She has ordered bariatric vitamin and mineral off [employer] with monthly delivery so she does not run out of it  Since she has a distance to drive, she will f/u with her PCP for B12 shots, her level is low and she had a good response in the past with monthly B12 shots  She has decreased her intake to Flavored coffees and has been either ordering it plain and adding SF creamer at home or ordering it skinny  She feels that keeping a food log allowed her to see where her extra calories are coming from and she has made adjustments based on caloric intake  Pt frustrated as she is expecting her period and frequently has an increase in fluid retention / weight with her period, so weight loss did not show on the scale  Reassured patient that her lifestyle changes will take some time to show on the scale and continue to do what she is doing  She has purchased a Pelaton and plans to do both aerobic and strength training activities  She discussed weight loss medications with her PCP , but unfortunately her local pharmacies are all out of stock    She will discuss trial of  Saxenda until Deanne Arellano is in stock  She likes accountability of body comp and will continue to f/u every 3 months     Goals- continued   Food journal, Exercise 30 minutes 5 times per week, Eat 3 meals per day and Eliminate mindless snacking   Focus on NOT grazing to decrease caloric intake   Food log 2792-5240 kcal per day/ 75-85 grams protein   Continue with bariatric vitamin /mineral( consider switching to one a day with iron )   Current vitamin and mineral contains 3000 mc ( 12660 IU ) of vitamin A per day for repletion   45-65 mg of iron per day with vitamin C to increase absorption   F/U with PCP for B12 shots as current vitamin /mineral already contains 1000 mcg B12   Increase intake of high vitamin A/Betacarotene foods and continue with bariatric vitamin and mineral which contains 3000 mcg of vitamin A per day     Body comp in 3 months       Repeat blood work as ordered by NP      Time Spent:   30 minutes

## 2023-06-29 ENCOUNTER — OFFICE VISIT (OUTPATIENT)
Dept: BARIATRICS | Facility: CLINIC | Age: 38
End: 2023-06-29

## 2023-06-29 VITALS — WEIGHT: 249.56 LBS | BODY MASS INDEX: 41.58 KG/M2 | HEIGHT: 65 IN

## 2023-06-29 DIAGNOSIS — R63.5 WEIGHT GAIN FOLLOWING GASTRIC BYPASS SURGERY: Primary | ICD-10-CM

## 2023-06-29 DIAGNOSIS — Z98.84 WEIGHT GAIN FOLLOWING GASTRIC BYPASS SURGERY: Primary | ICD-10-CM

## 2023-06-29 PROCEDURE — WEIGHT: Performed by: DIETITIAN, REGISTERED

## 2023-06-29 PROCEDURE — RECHECK: Performed by: DIETITIAN, REGISTERED

## 2023-09-27 NOTE — PROGRESS NOTES
Bariatric  Nutrition Assessment Note    Type of surgery  Gastric bypass: laparoscopic  Surgery Date: Dr Alessia Cotter 10/ 2007/ Revision with Dr Suhail Crow 2017 at [facility]  15 years  post-op  Surgeon: post op care provided the NP     Pt is here today for body comp     Nutrition Assessment   Carlos Adler  42 y.o.  female  Ht 5' 5" (1.651 m)   Wt 107 kg (236 lb 12.8 oz)   BMI 39.41 kg/m²      -13.7# x 3 months     Wt Readings from Last 3 Encounters:   06/29/23 113 kg (249 lb 9 oz)   05/25/23 113 kg (249 lb 12.8 oz)   04/14/23 112 kg (247 lb)     SECA REE= 1765 kcal per day   TANITA REE= [address] Equation:  1757 kcal per day   Estimated calories for weight maintenance:  2108 kcal ( sedentary  )   Estimated calories for weight loss 4318-6865 kcal per day ( 1-2# per wk wt loss - sedentary )  Estimated protein needs 63-95 grams per day (1.0-1.5 gms/kg IBW )   Estimated fluid needs 63-74 ounces per day ( 30-35 ml/kg IBW )      Weight on Day of Weight Loss Surgery: 270 lbs   Weight in (lb) to have BMI = 25: 139.3 lbs   Pre-Op Excess Wt: 130.7 lbs   Post-Op Wt Loss: 33.2#/ 25.4% EBWL in 15 year(s)  Andrew wt = 140 lbs     Pt reports increased weight with 2 pregnancies that she had after surgery. Review of History and Medications   No past medical history on file. Past Surgical History:   Procedure Laterality Date   • CHOLECYSTECTOMY     • DARRYN-EN-Y PROCEDURE       Social History     Socioeconomic History   • Marital status: Single     Spouse name: Not on file   • Number of children: Not on file   • Years of education: Not on file   • Highest education level: Not on file   Occupational History   • Not on file   Tobacco Use   • Smoking status: Never   • Smokeless tobacco: Not on file   Substance and Sexual Activity   • Alcohol use:  Yes   • Drug use: Not on file   • Sexual activity: Not on file   Other Topics Concern   • Not on file   Social History Narrative   • Not on file     Social Determinants of Health Financial Resource Strain: Not on file   Food Insecurity: Not on file   Transportation Needs: Not on file   Physical Activity: Not on file   Stress: Not on file   Social Connections: Not on file   Intimate Partner Violence: Not on file   Housing Stability: Not on file       Current Outpatient Medications:   •  Calcium Ascorbate (VITAMIN C) 500 mg tablet, Take 500 mg by mouth daily, Disp: , Rfl:   •  ferrous sulfate 325 (65 Fe) mg tablet, Take 325 mg by mouth daily with breakfast, Disp: , Rfl:   •  Multiple Vitamin (multivitamin) tablet, Take 1 tablet by mouth daily, Disp: , Rfl:     Food Intake and Lifestyle Assessment   Food Intake Assessment completed via usual diet recall  Follows 30/ 30 rule   Breakfast: 7 am to 9 am   2 eggs with cheese on  carb wrap - makes at home  Or [employer] egg white bites   1 cup coffee with SF vanilla    Or will have a protein shake for breakfast - diony coffee Atkins   Snack: 0  Lunch: 12 to 2 pm Four days per week- being more consistent with daily    Left overs from dinner   Protein- chicken, beef, seafood ( fish ) and pork ( home made pork sausage )   brussel sprouts or green beans , occasional red potato or yam   Snack: 0  Dinner: 6 pm - similar lunch   Snack:completely eliminated ice cream in the evenings   Sometimes cheese or cashews   Bedtime   Beverage intake: water, coffee/tea and caffeine free diet coke   Diet texture/stage: regular  Protein supplement: add protein scoop, with crystal one per day   44 calories/ 33 grams protein   Estimated protein intake per day: 80-90 grams per day   Estimated fluid intake per day: 32 ounces Yeti 2 to 3 per day   Meals eaten away from home: once per week - [facility]   Typical meal pattern: 3 meals per day and 0-1 snacks per day  Eating Behaviors: Appropriate diet advancement and Large portion sizes    Vitamin and Mineral regimen: she is taking one bariatric vitamin and mineral without iron ( this was an error)  3 capsules per day She has been inconsistent. Reports ongoing fatigue- was getting B12 shots and felt she has more energy   Started monthly B12 shots and plans to contact PCP to do more frequently   She had a better response with doing shots weekly     Food allergies or intolerances: none noted   Cultural or Jewish considerations: n/a    Physical Assessment  Nutrition Related Findings  Return of Hunger    Physical Activity  Types of exercise: Walking  2-3 times per week for 2-3 miles   GRUB over the summer, had PT and plans to slowly restart activity   Plans to walk during lunch time   Current physical limitations: none    Psychosocial Assessment   Support systems: spouse  Socioeconomic factors: works full time from home   Two evenings per week at school and homework ( additional 10 hours per week )     Nutrition Diagnosis- improving   Diagnosis: Overweight / Obesity (NC-3.3)  Related to: Physical inactivity and Excessive energy intake  As Evidenced by: BMI >25 and Unintentional weight gain     Nutrition Prescription: Recommend the following diet  7020-2034 kcal / 88 grams protein (6172-7276 kcal ) 85-90 grams protein     Meal Plan ( Dalton/Pro/Carb)   Breakfast: 300/15/30  Snack: 45/30/-   ( genepro mixed in crystal lite )   Lunch: 300/15/30  Snack: 150/>5/20  Dinner: 300/15/30  Snack: 150/>5/20     1250 kcal /85 grams protein       Interventions and Teaching   Patient educated on post-op nutrition guidelines. Patient educated and handouts provided.   Surgical changes to stomach / GI  Capacity of post-surgery stomach  Adequate hydration  Sugar and fat restriction to decrease "dumping syndrome"  Fat restriction to decrease steatorrhea  Expected weight loss  Weight loss plateaus/ possibility of weight regain  Exercise  Nutrition considerations after surgery  Protein supplements  Appropriate carbohydrate, protein, and fat intake, and food/fluid choices to maximize safe weight loss, nutrient intake, and tolerance   Dietary and lifestyle changes  Possible problems with poor eating habits  Intuitive eating  Techniques for self monitoring and keeping daily food journal  Vitamin / Mineral supplementation of Multivitamin with minerals, Calcium, Vitamin B12, Iron, Fat Soluble vitamins and Vitamin D  Increase intake of high vitamin A/Betacarotene foods and continue with bariatric vitamin and mineral which contains 3000 mcg of vitamin A per day - most recent vitamin A level is normal.   Iron levels normalized. B12 still low normal - B12 shots monthly at PCP office. Patient is not currently pregnant and doesn't desire to become pregnant a minimum of one year post-op    Discussed MWM and medications that may help with weight loss   Pt was ordered [facility] but was unable to fill her prescription      Education provided to: patient    Barriers to learning: No barriers identified    Readiness to change: preparation and action    Comprehension: needs reinforcement and verbalizes understanding     Expected Compliance: good    Evaluation/Monitoring   Eating pattern as discussed Tolerance of nutrition prescription Body weight Lab values Physical activity Bowel pattern  Pt continues to work from home and has started school two evenings per week. She has eliminated grazing and has planned snacks. She has incorporated protein shakes and bars into her diet when she does not have time for a meal.  She has ordered bariatric vitamin and mineral off [address] [place] [address] with monthly delivery so she does not run out of it. . She has decreased her intake of Flavored coffees and has been either ordering it plain and adding SF creamer at home or ordering it skinny. She feels that keeping a food log allowed her to see where her extra calories are coming from and she has made adjustments based on caloric intake. She has been keeping a log inconsistently.  She started monthly B12 shots with her PCP, plans to increase to weekly or bi weekly as she does not feel she is getting a response. She was unable to fill her prescription for [facility]. Will continue with current weight loss plan. When she plateaus, she will reconsider starting medication. She likes accountability of body comp and will continue to f/u every 3 months     Goals- continued   Food journal, Exercise 30 minutes 5 times per week, Eat 3 meals per day and Eliminate mindless snacking   Food log 8665-0279 kcal per day/ 75-85 grams protein   Continue with bariatric vitamin /mineral( consider switching to one a day with iron )  Continue with B12 shots at PCP office    Once weight loss plateaus, reconsider filling prescription for Wegovy to assist with weight loss  She was unable to obtain due to shortages.       Body comp in 3 months       Repeat blood work as ordered by NP      Time Spent:   30 minutes

## 2023-09-29 ENCOUNTER — OFFICE VISIT (OUTPATIENT)
Dept: BARIATRICS | Facility: CLINIC | Age: 38
End: 2023-09-29

## 2023-09-29 VITALS — WEIGHT: 236.8 LBS | HEIGHT: 65 IN | BODY MASS INDEX: 39.45 KG/M2

## 2023-09-29 DIAGNOSIS — Z98.84 WEIGHT GAIN FOLLOWING GASTRIC BYPASS SURGERY: Primary | ICD-10-CM

## 2023-09-29 DIAGNOSIS — R63.5 WEIGHT GAIN FOLLOWING GASTRIC BYPASS SURGERY: Primary | ICD-10-CM

## 2023-09-29 PROCEDURE — WEIGHT: Performed by: DIETITIAN, REGISTERED

## 2023-09-29 PROCEDURE — RECHECK: Performed by: DIETITIAN, REGISTERED

## 2023-12-20 NOTE — PROGRESS NOTES
"Bariatric  Nutrition Assessment Note    Type of surgery  Gastric bypass: laparoscopic  Surgery Date: Dr Manuel 10/ 2007/ Revision with Dr Velasco 2017 at [facility]  15 years  post-op  Surgeon: post op care provided the NP     Pt is here today for body comp   **previous body comp used 5 '5\" as height instead of 5 2 so BMI calculation was incorrect **    Nutrition Assessment   Kathy Barron  38 y.o.  female  Ht 5' 2\" (1.575 m)   Wt 107 kg (235 lb 0.2 oz)   BMI 42.98 kg/m²    -1# x 3 months      -15.5# x 8  months     Wt Readings from Last 3 Encounters:   09/29/23 107 kg (236 lb 12.8 oz)   06/29/23 113 kg (249 lb 9 oz)   05/25/23 113 kg (249 lb 12.8 oz)     SECA REE= 1712 kcal per day - at expected   SHAMA= 2054  Calories for weight loss = 2316-7773(1-2# per wk wt loss )     [place] Equation:  1757 kcal per day   Estimated calories for weight maintenance:  2108 kcal ( sedentary  )   Estimated calories for weight loss 6190-5665 kcal per day ( 1-2# per wk wt loss - sedentary )  Estimated protein needs 63-95 grams per day (1.0-1.5 gms/kg IBW )   Estimated fluid needs 63-74 ounces per day ( 30-35 ml/kg IBW )      Weight on Day of Weight Loss Surgery: 270 lbs   Weight in (lb) to have BMI = 25: 139.3 lbs   Pre-Op Excess Wt: 130.7 lbs   Post-Op Wt Loss: 33.2#/ 25.4% EBWL in 15 year(s)  Andrew wt = 140 lbs     Pt reports increased weight with 2 pregnancies that she had after surgery.     Review of History and Medications   No past medical history on file.  Past Surgical History:   Procedure Laterality Date    CHOLECYSTECTOMY      DARRYN-EN-Y PROCEDURE       Social History     Socioeconomic History    Marital status: Single     Spouse name: Not on file    Number of children: Not on file    Years of education: Not on file    Highest education level: Not on file   Occupational History    Not on file   Tobacco Use    Smoking status: Never    Smokeless tobacco: Not on file   Substance and Sexual Activity    Alcohol use: Yes    " Drug use: Not on file    Sexual activity: Not on file   Other Topics Concern    Not on file   Social History Narrative    Not on file     Social Determinants of Health     Financial Resource Strain: Not on file   Food Insecurity: Not on file   Transportation Needs: Not on file   Physical Activity: Not on file   Stress: Not on file   Social Connections: Not on file   Intimate Partner Violence: Not on file   Housing Stability: Not on file       Current Outpatient Medications:     Calcium Ascorbate (VITAMIN C) 500 mg tablet, Take 500 mg by mouth daily, Disp: , Rfl:     ferrous sulfate 325 (65 Fe) mg tablet, Take 325 mg by mouth daily with breakfast, Disp: , Rfl:     Multiple Vitamin (multivitamin) tablet, Take 1 tablet by mouth daily, Disp: , Rfl:     Food Intake and Lifestyle Assessment   Food Intake Assessment completed via usual diet recall  Follows 30/ 30 rule   Breakfast: 7 am to 9 am   2 eggs with cheese on  carb wrap - makes at home  Or [employer] egg white bites   1 cup coffee with SF vanilla    Or will have a protein shake for breakfast - diony coffee Atkins   Snack: 0  Lunch: 12 to 2 pm Four days per week- being more consistent with daily    Left overs from dinner   Protein- chicken, beef, seafood ( fish ) and pork ( home made pork sausage )   brussel sprouts or green beans , occasional red potato or yam   Snack: 0  Dinner: 6 pm - similar lunch   Snack:completely eliminated ice cream in the evenings   Sometimes cheese or cashews   Bedtime   Beverage intake: water, coffee/tea and caffeine free diet coke   Diet texture/stage: regular  Protein supplement: add protein scoop, with crystal one per day   44 calories/ 33 grams protein   Estimated protein intake per day: 80-90 grams per day   Estimated fluid intake per day: 32 ounces Yeti 2 to 3 per day   Meals eaten away from home: once per week - Kiswahili Chinese   Typical meal pattern: 3 meals per day and 0-1 snacks per day  Eating Behaviors: Appropriate diet  "advancement and Large portion sizes    Vitamin and Mineral regimen: she is taking one bariatric vitamin and mineral without iron ( this was an error)  3 capsules per day   She has been inconsistent.  Reports ongoing fatigue- was getting B12 shots and felt she has more energy   Started monthly B12 shots and plans to contact PCP to do more frequently   She had a better response with doing shots weekly     Food allergies or intolerances: none noted   Cultural or Islam considerations: n/a    Physical Assessment  Nutrition Related Findings  Return of Hunger    Physical Activity  Types of exercise: Walking  2-3 times per week for 2-3 miles   Fell over the summer, had PT and plans to slowly restart activity   Plans to walk during lunch time   Current physical limitations: none    Psychosocial Assessment   Support systems: spouse  Socioeconomic factors: works full time from home   Two evenings per week at school and homework ( additional 10 hours per week )     Nutrition Diagnosis- improving   Diagnosis: Overweight / Obesity (NC-3.3)  Related to: Physical inactivity and Excessive energy intake  As Evidenced by: BMI >25 and Unintentional weight gain     Nutrition Prescription: Recommend the following diet  6858-3639 kcal / 88 grams protein (5618-8271 kcal ) 85-90 grams protein     Meal Plan ( Dalton/Pro/Carb)   Breakfast: 300/15/30  Snack: 45/30/-   ( genepro mixed in crystal lite )   Lunch: 300/15/30  Snack: 150/>5/20  Dinner: 300/15/30  Snack: 150/>5/20     1250 kcal /85 grams protein       Interventions and Teaching   Patient educated on post-op nutrition guidelines.       Patient educated and handouts provided.  Surgical changes to stomach / GI  Capacity of post-surgery stomach  Adequate hydration  Sugar and fat restriction to decrease \"dumping syndrome\"  Fat restriction to decrease steatorrhea  Expected weight loss  Weight loss plateaus/ possibility of weight regain  Exercise  Nutrition considerations after " surgery  Protein supplements  Appropriate carbohydrate, protein, and fat intake, and food/fluid choices to maximize safe weight loss, nutrient intake, and tolerance   Dietary and lifestyle changes  Possible problems with poor eating habits  Intuitive eating  Techniques for self monitoring and keeping daily food journal  Vitamin / Mineral supplementation of Multivitamin with minerals, Calcium, Vitamin B12, Iron, Fat Soluble vitamins and Vitamin D  Increase intake of high vitamin A/Betacarotene foods and continue with bariatric vitamin and mineral which contains 3000 mcg of vitamin A per day - most recent vitamin A level is normal.   Iron levels normalized.   B12 still low normal - B12 shots monthly at PCP office.     Patient is not currently pregnant and doesn't desire to become pregnant a minimum of one year post-op    Discussed MWM and medications that may help with weight loss   Pt was ordered Wegovy but was unable to fill her prescription      Education provided to: patient    Barriers to learning: No barriers identified    Readiness to change: preparation and action    Comprehension: needs reinforcement and verbalizes understanding     Expected Compliance: good    Evaluation/Monitoring   Eating pattern as discussed Tolerance of nutrition prescription Body weight Lab values Physical activity Bowel pattern  Pt continues to work from home and has started school two evenings per week.  She has eliminated grazing and has planned snacks.  She has incorporated protein shakes and bars into her diet when she does not have time for a meal.  She has ordered bariatric vitamin and mineral off [employer] with monthly delivery so she does not run out of it. . She has decreased her intake of Flavored coffees and has been either ordering it plain and adding SF creamer at home or ordering it skinny.  She feels that keeping a food log allowed her to see where her extra calories are coming from and she has made adjustments based on  caloric intake. She has been keeping a log inconsistently. She started monthly B12 shots with her PCP, plans to increase to weekly or bi weekly as she does not feel she is getting a response.  She was unable to fill her prescription for Wegovy.  Will continue with current weight loss plan.  When she plateaus, she will reconsider starting medication.   She likes accountability of body comp and will continue to f/u every 3 months     Reviewed body comp results.  Pt maintaining parameters, lost 3# of fat mass  Of note, height was entered incorrect last body comp so BMI inaccurate ( entered as 5 ft 5 in, but actual 5ft 2 )     Goals- continued   Food journal, Exercise 30 minutes 5 times per week, Eat 3 meals per day and Eliminate mindless snacking   Food log 1948-7790 kcal per day/ 75-85 grams protein   Continue with bariatric vitamin /mineral( consider switching to one a day with iron )  Continue with B12 shots at PCP office    Once weight loss plateaus, reconsider filling prescription for Wegovy to assist with weight loss  She was unable to obtain due to shortages.      Body comp in 3 months        Time Spent:   30 minutes

## 2023-12-22 ENCOUNTER — OFFICE VISIT (OUTPATIENT)
Dept: BARIATRICS | Facility: CLINIC | Age: 38
End: 2023-12-22

## 2023-12-22 VITALS — BODY MASS INDEX: 43.25 KG/M2 | HEIGHT: 62 IN | WEIGHT: 235.01 LBS

## 2023-12-22 DIAGNOSIS — R63.5 WEIGHT GAIN FOLLOWING GASTRIC BYPASS SURGERY: Primary | ICD-10-CM

## 2023-12-22 DIAGNOSIS — Z98.84 WEIGHT GAIN FOLLOWING GASTRIC BYPASS SURGERY: Primary | ICD-10-CM

## 2023-12-22 PROCEDURE — WEIGHT: Performed by: DIETITIAN, REGISTERED

## 2023-12-22 PROCEDURE — RECHECK: Performed by: DIETITIAN, REGISTERED

## 2024-03-27 ENCOUNTER — CLINICAL SUPPORT (OUTPATIENT)
Dept: BARIATRICS | Facility: CLINIC | Age: 39
End: 2024-03-27

## 2024-03-27 VITALS — BODY MASS INDEX: 44.36 KG/M2 | WEIGHT: 241.08 LBS | HEIGHT: 62 IN

## 2024-03-27 DIAGNOSIS — R63.5 WEIGHT GAIN FOLLOWING GASTRIC BYPASS SURGERY: Primary | ICD-10-CM

## 2024-03-27 DIAGNOSIS — Z98.84 WEIGHT GAIN FOLLOWING GASTRIC BYPASS SURGERY: Primary | ICD-10-CM

## 2024-03-27 PROCEDURE — WEIGHT: Performed by: DIETITIAN, REGISTERED

## 2024-03-27 PROCEDURE — RECHECK: Performed by: DIETITIAN, REGISTERED

## 2024-03-27 NOTE — PROGRESS NOTES
"Bariatric  Nutrition Assessment Note    Type of surgery  Gastric bypass: laparoscopic  Surgery Date: Dr Manuel 10/ 2007/ Revision with Dr Velasco 2017 at [facility]  15 years  post-op  Surgeon: post op care provided the NP     Pt is here today for body comp       Nutrition Assessment   Kathy Barron  38 y.o.  female  Ht 5' 2\" (1.575 m)   Wt 109 kg (241 lb 1.3 oz)   BMI 44.09 kg/m²      + 6.1# x 3 month   Pt reports increased life stressors, recently , going to counseling, two children in travel teams     -9.4# x 11 months     Wt Readings from Last 3 Encounters:   12/22/23 107 kg (235 lb 0.2 oz)   09/29/23 107 kg (236 lb 12.8 oz)   06/29/23 113 kg (249 lb 9 oz)     SECA REE= 1734 kcal per day - at expected   SHAMA= 2084  Calories for weight loss = 9229-5248(1-2# per wk wt loss )   [place] Equation:  1757 kcal per day   Estimated calories for weight maintenance:  2108 kcal ( sedentary  )   Estimated calories for weight loss 0999-1533 kcal per day ( 1-2# per wk wt loss - sedentary )    Estimated protein needs 63-95 grams per day (1.0-1.5 gms/kg IBW )   Estimated fluid needs 63-74 ounces per day ( 30-35 ml/kg IBW )      Weight on Day of Weight Loss Surgery: 270 lbs   Weight in (lb) to have BMI = 25: 139.3 lbs   Pre-Op Excess Wt: 130.7 lbs   Post-Op Wt Loss: 33.2#/ 25.4% EBWL in 15 year(s)  Andrew wt = 140 lbs     Pt reports increased weight with 2 pregnancies that she had after surgery.     Review of History and Medications   No past medical history on file.  Past Surgical History:   Procedure Laterality Date    CHOLECYSTECTOMY      DARRYN-EN-Y PROCEDURE       Social History     Socioeconomic History    Marital status: Single     Spouse name: Not on file    Number of children: Not on file    Years of education: Not on file    Highest education level: Not on file   Occupational History    Not on file   Tobacco Use    Smoking status: Never    Smokeless tobacco: Not on file   Substance and Sexual Activity    " Alcohol use: Yes    Drug use: Not on file    Sexual activity: Not on file   Other Topics Concern    Not on file   Social History Narrative    Not on file     Social Determinants of Health     Financial Resource Strain: Not on file   Food Insecurity: Not on file   Transportation Needs: Not on file   Physical Activity: Not on file   Stress: Not on file   Social Connections: Not on file   Intimate Partner Violence: Not on file   Housing Stability: Not on file       Current Outpatient Medications:     Calcium Ascorbate (VITAMIN C) 500 mg tablet, Take 500 mg by mouth daily, Disp: , Rfl:     ferrous sulfate 325 (65 Fe) mg tablet, Take 325 mg by mouth daily with breakfast, Disp: , Rfl:     Multiple Vitamin (multivitamin) tablet, Take 1 tablet by mouth daily, Disp: , Rfl:     Food Intake and Lifestyle Assessment   Food Intake Assessment completed via usual diet recall  Follows 30/ 30 rule   Breakfast: 7 am to 9 am   2 eggs with cheese on  carb wrap - makes at home  Or [employer] egg white bites   1 cup coffee with SF vanilla    Or will have a protein shake for breakfast - diony coffee Atkins   Snack: 0  Lunch: 12 to 2 pm Four days per week- being more consistent with daily    Left overs from dinner   Protein- chicken, beef, seafood ( fish ) and pork ( home made pork sausage )   brussel sprouts or green beans , occasional red potato or yam   Snack: 0  Dinner: 6 pm - similar lunch   Snack:completely eliminated ice cream in the evenings   Sometimes cheese or cashews   Bedtime   Beverage intake: water, coffee/tea and caffeine free diet coke   Diet texture/stage: regular  Protein supplement: add protein scoop, with crystal one per day   44 calories/ 33 grams protein   Estimated protein intake per day: 80-90 grams per day   Estimated fluid intake per day: 32 ounces Yeti 2 to 3 per day   Meals eaten away from home: once per week - Welsh Chinese   Typical meal pattern: 3 meals per day and 0-1 snacks per day  Eating Behaviors:  "Appropriate diet advancement and Large portion sizes    Vitamin and Mineral regimen: she is taking one bariatric vitamin and mineral without iron ( this was an error)  3 capsules per day   She has been inconsistent.  Reports ongoing fatigue- was getting B12 shots and felt she has more energy   Started monthly B12 shots and plans to contact PCP to do more frequently   She had a better response with doing shots weekly     Food allergies or intolerances: none noted   Cultural or Jew considerations: n/a    Physical Assessment  Nutrition Related Findings  Return of Hunger    Physical Activity  Types of exercise: Walking  2-3 times per week    Plans to walk during lunch time and during soccer practice  Once school is finished will walk in evenings with walking group   Current physical limitations: none    Psychosocial Assessment   Support systems: spouse- recently  and going to counseling   Socioeconomic factors: works full time from home   Two evenings per week at school and homework ( additional 10 hours per week )   Increased stress with recent separation and two children in travel teams     Nutrition Diagnosis-continued   Diagnosis: Overweight / Obesity (NC-3.3)  Related to: Physical inactivity and Excessive energy intake  As Evidenced by: BMI >25 and Unintentional weight gain     Nutrition Prescription: Recommend the following diet  4181-9696 kcal / 88 grams protein (3844-5888 kcal ) 85-90 grams protein     Meal Plan ( [place]/Pro/Carb)   Breakfast: 300/15/30  Snack: 45/30/-   ( genepro mixed in crystal lite )   Lunch: 300/15/30  Snack: 150/>5/20  Dinner: 300/15/30  Snack: 150/>5/20     1250 kcal /85 grams protein       Interventions and Teaching   Patient educated on post-op nutrition guidelines.       Patient educated and handouts provided.  Surgical changes to stomach / GI  Capacity of post-surgery stomach  Adequate hydration  Sugar and fat restriction to decrease \"dumping syndrome\"  Fat restriction to " decrease steatorrhea  Expected weight loss  Weight loss plateaus/ possibility of weight regain  Exercise  Nutrition considerations after surgery  Protein supplements  Appropriate carbohydrate, protein, and fat intake, and food/fluid choices to maximize safe weight loss, nutrient intake, and tolerance   Dietary and lifestyle changes  Possible problems with poor eating habits  Intuitive eating  Techniques for self monitoring and keeping daily food journal  Vitamin / Mineral supplementation of Multivitamin with minerals, Calcium, Vitamin B12, Iron, Fat Soluble vitamins and Vitamin D  Increase intake of high vitamin A/Betacarotene foods and continue with bariatric vitamin and mineral which contains 3000 mcg of vitamin A per day - most recent vitamin A level is normal.   Iron levels normalized.   B12 still low normal - B12 shots monthly at PCP office.     Patient is not currently pregnant and doesn't desire to become pregnant a minimum of one year post-op    Discussed MWM and medications that may help with weight loss   Pt was ordered Wegovy but was unable to fill her prescription  - will check again to see if she can get prescription     Education provided to: patient    Barriers to learning: No barriers identified    Readiness to change: preparation and action    Comprehension: needs reinforcement and verbalizes understanding     Expected Compliance: good    Evaluation/Monitoring   Eating pattern as discussed Tolerance of nutrition prescription Body weight Lab values Physical activity Bowel pattern  Completed a body composition using SECA scale and reviewed results with patient Patient gained 3.05# of fat mass and 2.42# of muscle mass, with overall weight gain of 6.1# .  Waist circumference remained the same.   Pt with increased life stressors with recent separation from spouse.  Going to couple counseling and individual counseling.  Still working full time and going to school.  Once school is done, will walk in  evenings with walking group.  Plans to walk during soccer practice to increase movement.  Admits to dietary indiscretions with stress.  Focused on weight maintenance at this time.  Will f/u concerning insurance coverage and availability of weight loss medications.        Goals- continued   Food journal, Exercise 30 minutes 5 times per week, Eat 3 meals per day and Eliminate mindless snacking   Food log 0817-1444 kcal per day/ 75-85 grams protein   Reconsider filling prescription for Wegovy or Zepbound  to assist with weight loss  She was unable to obtain due to shortages.  Provided with pharmacy list to check for availability       Body comp in 3 months        Time Spent:   30 minutes

## 2024-04-15 ENCOUNTER — TELEPHONE (OUTPATIENT)
Dept: BARIATRICS | Facility: CLINIC | Age: 39
End: 2024-04-15